# Patient Record
(demographics unavailable — no encounter records)

---

## 2025-01-29 NOTE — DISCUSSION/SUMMARY
[FreeTextEntry1] : 28-year-old G0 here for annual GYN visit  Healthcare maintenance: Pap HPV collected.  Contraception: Patient engaged, planning marriage May 2026.  I reviewed Depo/implant or other progesterone options that can lead to irregular bleeding, hirsutism, depressive symptoms, and weight gain.  Patient is planning to get  does not want to gain weight.  Also she had a very bad experience with progesterone only pills and does not want this.  I also reviewed that the Mirena would not be a good option due to the irregular bleeding which was the most distressful to the patient.  We reviewed that with PFO that was fixed she could safely use progesterone IUD but she does not want the side effects of irregular bleeding.  Patient does not want anything containing estrogen.  Thus we reviewed ParaGard IUD as the other option, discussed the risk of dysmenorrhea/menorrhagia, patient states she could probably tolerate that.  Reviewed is good for 10 years, however if she wants pregnancy can have IUD removed and attempt pregnancy immediately.  We reviewed that her hormones would be working normally, no effect on ovarian reserve or future fertility.  Patient is very worried that this would be painful for her, thus I reviewed insertion of IUD during menses, preinsertion Cytotec, and preinsertion Motrin for optimization.  This was all written down for patient so she could think about next steps.  She wants to discuss with her partner  Annual plus consultation for contraception done 15 minutes of counseling.  Follow-up 1 year or sooner

## 2025-01-29 NOTE — HISTORY OF PRESENT ILLNESS
[FreeTextEntry1] : This is a 28-year-old G0 who presents for annual GYN visit, known to me from previous practice, no records available  Patient recently engaged!  Planning to get  May 2026  Sexually active, uses condoms but she is "over them".  Previously tried progesterone only pills in the past and she hated it, was bleeding all the time.  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  Patient subsequently was told never to be on hormones, and PFO was closed 2018  Patient wants to discuss other contraceptive options since she is tired of condoms  LMP January 21, irregular, always have been, every 30 to 40 days, bleeds for 7 days, heavy the first 2 days [PapSmeardate] : July 2023 [PGHxTotal] : 0